# Patient Record
Sex: FEMALE | Race: WHITE | NOT HISPANIC OR LATINO
[De-identification: names, ages, dates, MRNs, and addresses within clinical notes are randomized per-mention and may not be internally consistent; named-entity substitution may affect disease eponyms.]

---

## 2017-02-22 ENCOUNTER — APPOINTMENT (OUTPATIENT)
Dept: CARDIOLOGY | Facility: CLINIC | Age: 32
End: 2017-02-22

## 2017-02-22 ENCOUNTER — NON-APPOINTMENT (OUTPATIENT)
Age: 32
End: 2017-02-22

## 2017-02-22 VITALS
HEIGHT: 63 IN | DIASTOLIC BLOOD PRESSURE: 80 MMHG | SYSTOLIC BLOOD PRESSURE: 106 MMHG | WEIGHT: 160 LBS | HEART RATE: 80 BPM | BODY MASS INDEX: 28.35 KG/M2

## 2017-02-22 DIAGNOSIS — G43.009 MIGRAINE W/OUT AURA, NOT INTRACTABLE, W/OUT STATUS MIGRAINOSUS: ICD-10-CM

## 2017-02-22 DIAGNOSIS — F41.9 ANXIETY DISORDER, UNSPECIFIED: ICD-10-CM

## 2017-02-22 DIAGNOSIS — Z78.9 OTHER SPECIFIED HEALTH STATUS: ICD-10-CM

## 2017-02-23 LAB
BASOPHILS # BLD AUTO: 0.04 K/UL
BASOPHILS NFR BLD AUTO: 0.4 %
CHOLEST SERPL-MCNC: 176 MG/DL
CHOLEST/HDLC SERPL: 3.1 RATIO
EOSINOPHIL # BLD AUTO: 0.12 K/UL
EOSINOPHIL NFR BLD AUTO: 1.2 %
HCT VFR BLD CALC: 40.9 %
HDLC SERPL-MCNC: 57 MG/DL
HGB BLD-MCNC: 13.8 G/DL
IMM GRANULOCYTES NFR BLD AUTO: 0.2 %
LDLC SERPL CALC-MCNC: 95 MG/DL
LDLC SERPL DIRECT ASSAY-MCNC: 88 MG/DL
LYMPHOCYTES # BLD AUTO: 2.83 K/UL
LYMPHOCYTES NFR BLD AUTO: 28.4 %
MAN DIFF?: NORMAL
MCHC RBC-ENTMCNC: 31.1 PG
MCHC RBC-ENTMCNC: 33.7 GM/DL
MCV RBC AUTO: 92.1 FL
MONOCYTES # BLD AUTO: 0.41 K/UL
MONOCYTES NFR BLD AUTO: 4.1 %
NEUTROPHILS # BLD AUTO: 6.55 K/UL
NEUTROPHILS NFR BLD AUTO: 65.7 %
PLATELET # BLD AUTO: 272 K/UL
RBC # BLD: 4.44 M/UL
RBC # FLD: 12.6 %
TRIGL SERPL-MCNC: 122 MG/DL
WBC # FLD AUTO: 9.97 K/UL

## 2017-03-10 ENCOUNTER — APPOINTMENT (OUTPATIENT)
Dept: CARDIOLOGY | Facility: CLINIC | Age: 32
End: 2017-03-10

## 2017-03-10 ENCOUNTER — NON-APPOINTMENT (OUTPATIENT)
Age: 32
End: 2017-03-10

## 2017-03-10 VITALS
HEART RATE: 82 BPM | SYSTOLIC BLOOD PRESSURE: 108 MMHG | HEIGHT: 63 IN | BODY MASS INDEX: 28.53 KG/M2 | DIASTOLIC BLOOD PRESSURE: 78 MMHG | RESPIRATION RATE: 14 BRPM | WEIGHT: 161 LBS

## 2017-03-10 LAB
ALBUMIN SERPL ELPH-MCNC: 4.3 G/DL
ALP BLD-CCNC: 97 U/L
ALT SERPL-CCNC: 127 U/L
ANION GAP SERPL CALC-SCNC: 16 MMOL/L
AST SERPL-CCNC: 59 U/L
BILIRUB SERPL-MCNC: 0.4 MG/DL
BUN SERPL-MCNC: 10 MG/DL
CALCIUM SERPL-MCNC: 9.8 MG/DL
CHLORIDE SERPL-SCNC: 102 MMOL/L
CO2 SERPL-SCNC: 22 MMOL/L
CREAT SERPL-MCNC: 0.72 MG/DL
GLUCOSE SERPL-MCNC: 106 MG/DL
POTASSIUM SERPL-SCNC: 4.6 MMOL/L
PROT SERPL-MCNC: 7.7 G/DL
SODIUM SERPL-SCNC: 140 MMOL/L

## 2017-03-13 LAB
ALBUMIN SERPL ELPH-MCNC: 5 G/DL
ALP BLD-CCNC: 80 U/L
ALT SERPL-CCNC: 58 U/L
AST SERPL-CCNC: 36 U/L
BILIRUB DIRECT SERPL-MCNC: 0.1 MG/DL
BILIRUB INDIRECT SERPL-MCNC: 0.3 MG/DL
BILIRUB SERPL-MCNC: 0.4 MG/DL
PROT SERPL-MCNC: 8.3 G/DL

## 2018-01-10 ENCOUNTER — APPOINTMENT (OUTPATIENT)
Dept: OTOLARYNGOLOGY | Facility: CLINIC | Age: 33
End: 2018-01-10
Payer: COMMERCIAL

## 2018-01-10 VITALS
WEIGHT: 145 LBS | SYSTOLIC BLOOD PRESSURE: 104 MMHG | BODY MASS INDEX: 25.69 KG/M2 | DIASTOLIC BLOOD PRESSURE: 74 MMHG | HEIGHT: 63 IN | HEART RATE: 93 BPM

## 2018-01-10 DIAGNOSIS — J34.2 DEVIATED NASAL SEPTUM: ICD-10-CM

## 2018-01-10 DIAGNOSIS — H90.A12 CONDUCTIVE HEARING LOSS, UNILATERAL, LEFT EAR WITH RESTRICTED HEARING ON THE CONTRALATERAL SIDE: ICD-10-CM

## 2018-01-10 DIAGNOSIS — H69.83 OTHER SPECIFIED DISORDERS OF EUSTACHIAN TUBE, BILATERAL: ICD-10-CM

## 2018-01-10 DIAGNOSIS — J31.0 CHRONIC RHINITIS: ICD-10-CM

## 2018-01-10 DIAGNOSIS — M26.609 UNSPECIFIED TEMPOROMANDIBULAR JOINT DISORDER: ICD-10-CM

## 2018-01-10 PROCEDURE — 92557 COMPREHENSIVE HEARING TEST: CPT

## 2018-01-10 PROCEDURE — 92567 TYMPANOMETRY: CPT

## 2018-01-10 PROCEDURE — 99214 OFFICE O/P EST MOD 30 MIN: CPT | Mod: 25

## 2018-01-10 RX ORDER — NORETHINDRONE ACETATE AND ETHINYL ESTRADIOL AND FERROUS FUMARATE 1MG-20(21)
1-20 KIT ORAL
Qty: 28 | Refills: 0 | Status: ACTIVE | COMMUNITY
Start: 2017-10-18

## 2018-11-27 ENCOUNTER — RESULT REVIEW (OUTPATIENT)
Age: 33
End: 2018-11-27

## 2019-01-22 ENCOUNTER — NON-APPOINTMENT (OUTPATIENT)
Age: 34
End: 2019-01-22

## 2019-01-22 ENCOUNTER — APPOINTMENT (OUTPATIENT)
Dept: CARDIOLOGY | Facility: CLINIC | Age: 34
End: 2019-01-22
Payer: COMMERCIAL

## 2019-01-22 VITALS
HEIGHT: 63 IN | OXYGEN SATURATION: 100 % | DIASTOLIC BLOOD PRESSURE: 80 MMHG | BODY MASS INDEX: 30.12 KG/M2 | WEIGHT: 170 LBS | SYSTOLIC BLOOD PRESSURE: 117 MMHG | HEART RATE: 102 BPM

## 2019-01-22 DIAGNOSIS — R07.2 PRECORDIAL PAIN: ICD-10-CM

## 2019-01-22 DIAGNOSIS — Z02.82 ENCOUNTER FOR ADOPTION SERVICES: ICD-10-CM

## 2019-01-22 DIAGNOSIS — R06.02 SHORTNESS OF BREATH: ICD-10-CM

## 2019-01-22 PROCEDURE — 93000 ELECTROCARDIOGRAM COMPLETE: CPT | Mod: 59

## 2019-01-22 PROCEDURE — 93325 DOPPLER ECHO COLOR FLOW MAPG: CPT

## 2019-01-22 PROCEDURE — 93312 ECHO TRANSESOPHAGEAL: CPT

## 2019-01-22 PROCEDURE — 99214 OFFICE O/P EST MOD 30 MIN: CPT

## 2019-01-22 PROCEDURE — 93320 DOPPLER ECHO COMPLETE: CPT

## 2019-01-22 RX ORDER — METAXALONE 800 MG/1
800 TABLET ORAL
Qty: 5 | Refills: 0 | Status: DISCONTINUED | COMMUNITY
Start: 2018-01-06 | End: 2019-01-22

## 2019-01-22 RX ORDER — FLUOXETINE HYDROCHLORIDE 10 MG/1
10 CAPSULE ORAL
Qty: 30 | Refills: 0 | Status: DISCONTINUED | COMMUNITY
Start: 2017-12-05 | End: 2019-01-22

## 2019-01-22 RX ORDER — FLUOXETINE HYDROCHLORIDE 10 MG/1
10 TABLET ORAL
Qty: 30 | Refills: 0 | Status: DISCONTINUED | COMMUNITY
Start: 2017-11-16 | End: 2019-01-22

## 2019-01-22 RX ORDER — NAPROXEN 500 MG/1
500 TABLET, DELAYED RELEASE ORAL
Qty: 60 | Refills: 3 | Status: COMPLETED | COMMUNITY
Start: 2017-02-22 | End: 2019-01-22

## 2019-01-22 NOTE — HISTORY OF PRESENT ILLNESS
[FreeTextEntry1] : I last saw her in May of 2017.\par \par She reports that four days ago, she began to experience precordial chest pain which has persisted continuously since that time.  She was seen at at [facility] office, and EKG and CXR were unremarkable.  Motrin has not provided significant relief.  When lying recumbent, the discomfort is worse and she then also experiences SOB.  She is aware that her HR is faster (110-120) with occasional palpitations.  She reports no fever or other constitutional signs.  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  At that time, a CT scan showed a pericardial effusion and she was treated for presumptive pericarditis with NSAIDs.

## 2019-01-22 NOTE — PHYSICAL EXAM
[General Appearance - Well Developed] : well developed [Normal Appearance] : normal appearance [Well Groomed] : well groomed [General Appearance - Well Nourished] : well nourished [General Appearance - In No Acute Distress] : no acute distress [Normal Conjunctiva] : the conjunctiva exhibited no abnormalities [Eyelids - No Xanthelasma] : the eyelids demonstrated no xanthelasmas [Normal Jugular Venous A Waves Present] : normal jugular venous A waves present [Normal Jugular Venous V Waves Present] : normal jugular venous V waves present [No Jugular Venous Medellin A Waves] : no jugular venous medellin A waves [Respiration, Rhythm And Depth] : normal respiratory rhythm and effort [Auscultation Breath Sounds / Voice Sounds] : lungs were clear to auscultation bilaterally [Heart Rate And Rhythm] : heart rate and rhythm were normal [Bowel Sounds] : normal bowel sounds [Abnormal Walk] : normal gait [Gait - Sufficient For Exercise Testing] : the gait was sufficient for exercise testing [Nail Clubbing] : no clubbing of the fingernails [Cyanosis, Localized] : no localized cyanosis [Petechial Hemorrhages (___cm)] : no petechial hemorrhages [Skin Color & Pigmentation] : normal skin color and pigmentation [] : no rash [No Venous Stasis] : no venous stasis [Skin Lesions] : no skin lesions [No Skin Ulcers] : no skin ulcer [No Xanthoma] : no  xanthoma was observed [Oriented To Time, Place, And Person] : oriented to person, place, and time [Impaired Insight] : insight and judgment were intact [Affect] : the affect was normal [Mood] : the mood was normal [FreeTextEntry1] : PMI not displaced. First and second heart sounds are normal. No murmur, rub or gallop appreciated.

## 2019-08-06 ENCOUNTER — RESULT REVIEW (OUTPATIENT)
Age: 34
End: 2019-08-06

## 2019-09-11 ENCOUNTER — NON-APPOINTMENT (OUTPATIENT)
Age: 34
End: 2019-09-11

## 2019-09-11 ENCOUNTER — APPOINTMENT (OUTPATIENT)
Dept: CARDIOLOGY | Facility: CLINIC | Age: 34
End: 2019-09-11
Payer: COMMERCIAL

## 2019-09-11 VITALS
WEIGHT: 167 LBS | TEMPERATURE: 98.7 F | BODY MASS INDEX: 29.59 KG/M2 | HEIGHT: 63 IN | OXYGEN SATURATION: 99 % | DIASTOLIC BLOOD PRESSURE: 84 MMHG | HEART RATE: 105 BPM | SYSTOLIC BLOOD PRESSURE: 129 MMHG

## 2019-09-11 PROCEDURE — 93000 ELECTROCARDIOGRAM COMPLETE: CPT

## 2019-09-11 PROCEDURE — 99214 OFFICE O/P EST MOD 30 MIN: CPT

## 2019-09-11 RX ORDER — NAPROXEN 500 MG/1
500 TABLET ORAL TWICE DAILY
Qty: 60 | Refills: 3 | Status: ACTIVE | COMMUNITY
Start: 2019-01-22 | End: 1900-01-01

## 2019-09-11 NOTE — DISCUSSION/SUMMARY
[Non-Cardiac] : non-cardiac chest pain [Musculoskeletal Chest Pain] : musculoskeletal chest pain [Medication Changes Per Orders] : as documented in orders [Patient] : the patient [de-identified] : reasonable to have trial of NSAID, naproxen beneficial in past

## 2019-09-11 NOTE — HISTORY OF PRESENT ILLNESS
[FreeTextEntry1] : Ms Noelle Hobbs is a 34 year old woman requesting urgent visit today for pain in left side. previously evaluated for possible pericarditis and symptoms subsided after period of NSAID therapy. At that time had precordial chest pain which was persistent. Lying recumbent, exacerbated symptoms with associated SOB. She recalled a similar episode in 2015 when a CT scan showed a pericardial effusion and she was treated for presumptive pericarditis with NSAIDs.\par \par Woke with current symptoms at 2 AM, pain in left shoulder radiating down to fingers of left hand and radiating around toward left mid back.  Pain was not clearly exacerbated by deep breaths and it did hurt slightly to move the left arm.  Pain is currently less severe but still bothersome. She is not short of breath or having palpitations.

## 2019-09-11 NOTE — PHYSICAL EXAM
[Normal Appearance] : normal appearance [General Appearance - Well Developed] : well developed [Well Groomed] : well groomed [General Appearance - Well Nourished] : well nourished [Normal Conjunctiva] : the conjunctiva exhibited no abnormalities [General Appearance - In No Acute Distress] : no acute distress [Normal Jugular Venous A Waves Present] : normal jugular venous A waves present [Eyelids - No Xanthelasma] : the eyelids demonstrated no xanthelasmas [Normal Jugular Venous V Waves Present] : normal jugular venous V waves present [No Jugular Venous Medellin A Waves] : no jugular venous medellin A waves [Respiration, Rhythm And Depth] : normal respiratory rhythm and effort [Auscultation Breath Sounds / Voice Sounds] : lungs were clear to auscultation bilaterally [Heart Rate And Rhythm] : heart rate and rhythm were normal [Bowel Sounds] : normal bowel sounds [Abnormal Walk] : normal gait [Nail Clubbing] : no clubbing of the fingernails [Cyanosis, Localized] : no localized cyanosis [Petechial Hemorrhages (___cm)] : no petechial hemorrhages [] : no rash [Skin Color & Pigmentation] : normal skin color and pigmentation [No Venous Stasis] : no venous stasis [Skin Lesions] : no skin lesions [No Skin Ulcers] : no skin ulcer [No Xanthoma] : no  xanthoma was observed [Oriented To Time, Place, And Person] : oriented to person, place, and time [Impaired Insight] : insight and judgment were intact [Affect] : the affect was normal [Mood] : the mood was normal [Normal Oral Mucosa] : normal oral mucosa [No Oral Pallor] : no oral pallor [No Oral Cyanosis] : no oral cyanosis [FreeTextEntry1] : mild discomfort on rotation of shoulder

## 2020-02-27 ENCOUNTER — APPOINTMENT (OUTPATIENT)
Dept: OPHTHALMOLOGY | Facility: CLINIC | Age: 35
End: 2020-02-27
Payer: SELF-PAY

## 2020-02-27 ENCOUNTER — NON-APPOINTMENT (OUTPATIENT)
Age: 35
End: 2020-02-27

## 2020-02-27 PROCEDURE — 92002 INTRM OPH EXAM NEW PATIENT: CPT

## 2020-06-09 ENCOUNTER — RESULT REVIEW (OUTPATIENT)
Age: 35
End: 2020-06-09

## 2020-06-18 ENCOUNTER — APPOINTMENT (OUTPATIENT)
Dept: SURGICAL ONCOLOGY | Facility: CLINIC | Age: 35
End: 2020-06-18
Payer: COMMERCIAL

## 2020-06-18 VITALS
SYSTOLIC BLOOD PRESSURE: 119 MMHG | HEART RATE: 107 BPM | DIASTOLIC BLOOD PRESSURE: 82 MMHG | OXYGEN SATURATION: 98 % | WEIGHT: 176 LBS | BODY MASS INDEX: 31.18 KG/M2 | HEIGHT: 63 IN

## 2020-06-18 VITALS — TEMPERATURE: 97.1 F

## 2020-06-18 DIAGNOSIS — R07.89 OTHER CHEST PAIN: ICD-10-CM

## 2020-06-18 PROCEDURE — 99205 OFFICE O/P NEW HI 60 MIN: CPT

## 2020-06-18 NOTE — HISTORY OF PRESENT ILLNESS
[de-identified] : 35 yo female with right nipple brown discharge. this has been present for 6 months. It is spontaneous.\par She did have an intraductal papilloma excised from her left breast by me 13 years ago.\par \par fibrocystic changes but no masses

## 2020-06-18 NOTE — ASSESSMENT
[FreeTextEntry1] : IMP:\par Right breast nipple dishcarge\par \par Plan:\par right breast duct exploration

## 2020-06-18 NOTE — CONSULT LETTER
[Consult Letter:] : I had the pleasure of evaluating your patient, [unfilled]. [Dear  ___] : Dear  [unfilled], [Consult Closing:] : Thank you very much for allowing me to participate in the care of this patient.  If you have any questions, please do not hesitate to contact me. [Sincerely,] : Sincerely, [Please see my note below.] : Please see my note below. [FreeTextEntry3] : Narciso Erwin MD FACS\par Chief of Surgical Oncology\par \par  [FreeTextEntry1] : I will keep you informed of the final pathology.

## 2020-06-18 NOTE — PHYSICAL EXAM
[Normal] : supple, no neck mass and thyroid not enlarged [Normal Supraclavicular Lymph Nodes] : normal supraclavicular lymph nodes [Normal Neck Lymph Nodes] : normal neck lymph nodes  [Normal Axillary Lymph Nodes] : normal axillary lymph nodes [Normal] : oriented to person, place and time, with appropriate affect [de-identified] : Fibrocystic changes with brown guaiac + drainage from 11-12 o'clock right breast

## 2020-06-30 ENCOUNTER — OUTPATIENT (OUTPATIENT)
Dept: OUTPATIENT SERVICES | Facility: HOSPITAL | Age: 35
LOS: 1 days | End: 2020-06-30

## 2020-06-30 VITALS
OXYGEN SATURATION: 99 % | WEIGHT: 179.9 LBS | HEIGHT: 63 IN | HEART RATE: 114 BPM | SYSTOLIC BLOOD PRESSURE: 120 MMHG | RESPIRATION RATE: 18 BRPM | DIASTOLIC BLOOD PRESSURE: 88 MMHG | TEMPERATURE: 99 F

## 2020-06-30 DIAGNOSIS — R07.89 OTHER CHEST PAIN: ICD-10-CM

## 2020-06-30 DIAGNOSIS — Z98.890 OTHER SPECIFIED POSTPROCEDURAL STATES: Chronic | ICD-10-CM

## 2020-06-30 DIAGNOSIS — Z90.49 ACQUIRED ABSENCE OF OTHER SPECIFIED PARTS OF DIGESTIVE TRACT: Chronic | ICD-10-CM

## 2020-06-30 LAB
HCG UR-SCNC: NEGATIVE — SIGNIFICANT CHANGE UP
HCT VFR BLD CALC: 40.4 % — SIGNIFICANT CHANGE UP (ref 34.5–45)
HGB BLD-MCNC: 13.8 G/DL — SIGNIFICANT CHANGE UP (ref 11.5–15.5)
MCHC RBC-ENTMCNC: 30.1 PG — SIGNIFICANT CHANGE UP (ref 27–34)
MCHC RBC-ENTMCNC: 34.2 % — SIGNIFICANT CHANGE UP (ref 32–36)
MCV RBC AUTO: 88 FL — SIGNIFICANT CHANGE UP (ref 80–100)
NRBC # FLD: 0 K/UL — SIGNIFICANT CHANGE UP (ref 0–0)
PLATELET # BLD AUTO: 337 K/UL — SIGNIFICANT CHANGE UP (ref 150–400)
PMV BLD: 9.9 FL — SIGNIFICANT CHANGE UP (ref 7–13)
RBC # BLD: 4.59 M/UL — SIGNIFICANT CHANGE UP (ref 3.8–5.2)
RBC # FLD: 12.2 % — SIGNIFICANT CHANGE UP (ref 10.3–14.5)
SP GR UR: 1.03 — SIGNIFICANT CHANGE UP (ref 1–1.04)
WBC # BLD: 9.78 K/UL — SIGNIFICANT CHANGE UP (ref 3.8–10.5)
WBC # FLD AUTO: 9.78 K/UL — SIGNIFICANT CHANGE UP (ref 3.8–10.5)

## 2020-06-30 NOTE — H&P PST ADULT - NEGATIVE GENERAL GENITOURINARY SYMPTOMS
no hematuria/no renal colic/no flank pain L/no flank pain R/no incontinence/no bladder infections/no dysuria

## 2020-06-30 NOTE — H&P PST ADULT - NSICDXPASTMEDICALHX_GEN_ALL_CORE_FT
PAST MEDICAL HISTORY:  Depression     Other chest pain PAST MEDICAL HISTORY:  Anxiety and depression     Other chest pain

## 2020-06-30 NOTE — H&P PST ADULT - NEGATIVE ENMT SYMPTOMS
no nasal congestion/no hearing difficulty/no post-nasal discharge/no sinus symptoms/no throat pain/no dysphagia

## 2020-06-30 NOTE — H&P PST ADULT - MUSCULOSKELETAL
details… detailed exam no calf tenderness/no joint swelling/ROM intact/normal strength/no joint erythema

## 2020-06-30 NOTE — H&P PST ADULT - HISTORY OF PRESENT ILLNESS
34 y.o. female with hx of intraductal papilloma right breast, s/p surgery in 1992, c/o right breast nipple bloody discharge x 6 months, reports right breast "mild" tenderness, denies breast changes, weight loss or fatigue, scheduled for right breast duct exploration on 07/09/2020

## 2020-06-30 NOTE — H&P PST ADULT - NSICDXPROBLEM_GEN_ALL_CORE_FT
PROBLEM DIAGNOSES  Problem: Other chest pain  Assessment and Plan: pt scheduled for right breast duct exploration on 07/09/2020  Preop instructions provided. Pt verbalized understanding.   Pepcid for GI prophylaxis with written and verbal instruction provided    written and verbal instructions with teach back on chlorhexidine shampoo provided,  pt verbalized understanding with returned demonstration   covid testing pending on 07/06/2020, testing site # given  allergy to PCN-OR booking notified

## 2020-07-05 DIAGNOSIS — Z01.818 ENCOUNTER FOR OTHER PREPROCEDURAL EXAMINATION: ICD-10-CM

## 2020-07-06 ENCOUNTER — APPOINTMENT (OUTPATIENT)
Dept: DISASTER EMERGENCY | Facility: CLINIC | Age: 35
End: 2020-07-06

## 2020-07-06 LAB — SARS-COV-2 N GENE NPH QL NAA+PROBE: NOT DETECTED

## 2020-07-08 ENCOUNTER — TRANSCRIPTION ENCOUNTER (OUTPATIENT)
Age: 35
End: 2020-07-08

## 2020-07-09 ENCOUNTER — RESULT REVIEW (OUTPATIENT)
Age: 35
End: 2020-07-09

## 2020-07-09 ENCOUNTER — OUTPATIENT (OUTPATIENT)
Dept: OUTPATIENT SERVICES | Facility: HOSPITAL | Age: 35
LOS: 1 days | Discharge: HOME | End: 2020-07-09
Payer: COMMERCIAL

## 2020-07-09 ENCOUNTER — APPOINTMENT (OUTPATIENT)
Dept: SURGICAL ONCOLOGY | Facility: AMBULATORY SURGERY CENTER | Age: 35
End: 2020-07-09

## 2020-07-09 VITALS
SYSTOLIC BLOOD PRESSURE: 122 MMHG | RESPIRATION RATE: 18 BRPM | WEIGHT: 179.9 LBS | TEMPERATURE: 98 F | HEART RATE: 98 BPM | HEIGHT: 63 IN | DIASTOLIC BLOOD PRESSURE: 76 MMHG | OXYGEN SATURATION: 100 %

## 2020-07-09 VITALS
SYSTOLIC BLOOD PRESSURE: 112 MMHG | RESPIRATION RATE: 16 BRPM | DIASTOLIC BLOOD PRESSURE: 86 MMHG | OXYGEN SATURATION: 100 % | HEART RATE: 89 BPM

## 2020-07-09 DIAGNOSIS — Z98.890 OTHER SPECIFIED POSTPROCEDURAL STATES: Chronic | ICD-10-CM

## 2020-07-09 DIAGNOSIS — R07.89 OTHER CHEST PAIN: ICD-10-CM

## 2020-07-09 DIAGNOSIS — Z90.49 ACQUIRED ABSENCE OF OTHER SPECIFIED PARTS OF DIGESTIVE TRACT: Chronic | ICD-10-CM

## 2020-07-09 PROCEDURE — 19301 PARTIAL MASTECTOMY: CPT

## 2020-07-09 PROCEDURE — 88307 TISSUE EXAM BY PATHOLOGIST: CPT | Mod: 26

## 2020-07-09 PROCEDURE — 88305 TISSUE EXAM BY PATHOLOGIST: CPT | Mod: 26

## 2020-07-09 RX ORDER — NORETHINDRONE AND ETHINYL ESTRADIOL 0.4-0.035
1 KIT ORAL
Qty: 0 | Refills: 0 | DISCHARGE

## 2020-07-09 RX ORDER — FLUOXETINE HCL 10 MG
1 CAPSULE ORAL
Qty: 0 | Refills: 0 | DISCHARGE

## 2020-07-09 RX ORDER — BUPROPION HYDROCHLORIDE 150 MG/1
150 TABLET, EXTENDED RELEASE ORAL
Qty: 0 | Refills: 0 | DISCHARGE

## 2020-07-09 NOTE — BRIEF OPERATIVE NOTE - OPERATION/FINDINGS
Surgical site was marked preoperatively and confirmed. Skin was prepped and draped in sterile fashion. Local was injected along right areola. Duct wire was inserted into right nipple. Incision was made along right areola and tissue was dissected down to duct. The distal duct was excised and margins were taken and sent to permanent. The site was hemostatic and closed with 2-0 vicryl deep stitches. Skin was approximated with dermal stitches and skin was closed with 4-0 monocryl. Sterile dressing was applied.

## 2020-07-09 NOTE — ASU DISCHARGE PLAN (ADULT/PEDIATRIC) - FOLLOW UP APPOINTMENTS
911 or go to the nearest Emergency Room [facility] ([facility]):

## 2020-07-09 NOTE — ASU DISCHARGE PLAN (ADULT/PEDIATRIC) - ASU DC SPECIAL INSTRUCTIONSFT
Please keep dressing on for 24 hours. Do not bathe for 24 hours. After 24 hours you may remove dressing and shower. Please leave steri strips on until they fall off. Follow up with Dr. Erwin in 2 weeks.

## 2020-07-09 NOTE — ASU DISCHARGE PLAN (ADULT/PEDIATRIC) - CARE PROVIDER_API CALL
Narciso Erwin  SURGERY  [address] [address] [place] [id number]  Phone: [phone]  Fax: [phone]  Follow Up Time: 2 weeks

## 2020-07-09 NOTE — BRIEF OPERATIVE NOTE - SPECIMENS
Right breast papilloma; Superior margin; Medial margin; Lateral margin; Inferior margin; Deep margin

## 2020-07-09 NOTE — ASU DISCHARGE PLAN (ADULT/PEDIATRIC) - CALL YOUR DOCTOR IF YOU HAVE ANY OF THE FOLLOWING:
Swelling that gets worse/Pain not relieved by Medications/Numbness, tingling, color or temperature change to extremity/Wound/Surgical Site with redness, or foul smelling discharge or pus/Bleeding that does not stop/Fever greater than (need to indicate Fahrenheit or Celsius)

## 2020-07-13 PROBLEM — R07.89 OTHER CHEST PAIN: Chronic | Status: ACTIVE | Noted: 2020-06-30

## 2020-07-13 PROBLEM — F41.9 ANXIETY DISORDER, UNSPECIFIED: Chronic | Status: ACTIVE | Noted: 2020-06-30

## 2020-07-14 LAB — SURGICAL PATHOLOGY STUDY: SIGNIFICANT CHANGE UP

## 2020-07-20 ENCOUNTER — APPOINTMENT (OUTPATIENT)
Dept: SURGICAL ONCOLOGY | Facility: CLINIC | Age: 35
End: 2020-07-20
Payer: COMMERCIAL

## 2020-07-20 ENCOUNTER — TRANSCRIPTION ENCOUNTER (OUTPATIENT)
Age: 35
End: 2020-07-20

## 2020-07-20 VITALS
HEART RATE: 97 BPM | BODY MASS INDEX: 31.18 KG/M2 | HEIGHT: 63 IN | SYSTOLIC BLOOD PRESSURE: 124 MMHG | DIASTOLIC BLOOD PRESSURE: 84 MMHG | OXYGEN SATURATION: 99 % | WEIGHT: 176 LBS

## 2020-07-20 DIAGNOSIS — N64.89 OTHER SPECIFIED DISORDERS OF BREAST: ICD-10-CM

## 2020-07-20 PROCEDURE — 99214 OFFICE O/P EST MOD 30 MIN: CPT | Mod: 24

## 2020-07-20 NOTE — HISTORY OF PRESENT ILLNESS
[de-identified] : 36 YO female S/P right breast duct exporation for bloody discharge. Pathology showed hyperplasia and dilated ducts but no atypia.\par \par

## 2020-07-20 NOTE — CONSULT LETTER
[Dear  ___] : Dear  [unfilled], [Courtesy Letter:] : I had the pleasure of seeing your patient, [unfilled], in my office today. [Please see my note below.] : Please see my note below. [FreeTextEntry1] : I will see her next year when she gets a baseline mammogram. [Consult Closing:] : Thank you very much for allowing me to participate in the care of this patient.  If you have any questions, please do not hesitate to contact me. [Sincerely,] : Sincerely, [FreeTextEntry3] : Narciso Erwin MD FACS\par Chief of Surgical Oncology\par \par

## 2020-07-20 NOTE — ASSESSMENT
[FreeTextEntry1] : IMP:\par Fibrocystic changes with hyperplasia but no atypia\par \par Plan:\par Baseline mammogram and sonogram and RTO 1 year ( at age 36 )

## 2021-02-10 ENCOUNTER — NON-APPOINTMENT (OUTPATIENT)
Age: 36
End: 2021-02-10

## 2021-06-22 ENCOUNTER — RESULT REVIEW (OUTPATIENT)
Age: 36
End: 2021-06-22

## 2021-07-28 ENCOUNTER — APPOINTMENT (OUTPATIENT)
Dept: OTOLARYNGOLOGY | Facility: CLINIC | Age: 36
End: 2021-07-28

## 2021-07-28 ENCOUNTER — APPOINTMENT (OUTPATIENT)
Dept: OTOLARYNGOLOGY | Facility: CLINIC | Age: 36
End: 2021-07-28
Payer: COMMERCIAL

## 2021-07-28 VITALS
SYSTOLIC BLOOD PRESSURE: 119 MMHG | TEMPERATURE: 97.6 F | WEIGHT: 180 LBS | OXYGEN SATURATION: 98 % | HEIGHT: 63 IN | BODY MASS INDEX: 31.89 KG/M2 | DIASTOLIC BLOOD PRESSURE: 83 MMHG | HEART RATE: 102 BPM

## 2021-07-28 DIAGNOSIS — R42 DIZZINESS AND GIDDINESS: ICD-10-CM

## 2021-07-28 PROCEDURE — 99072 ADDL SUPL MATRL&STAF TM PHE: CPT

## 2021-07-28 PROCEDURE — 99203 OFFICE O/P NEW LOW 30 MIN: CPT

## 2021-07-28 RX ORDER — FLUOXETINE HYDROCHLORIDE 40 MG/1
CAPSULE ORAL
Refills: 0 | Status: ACTIVE | COMMUNITY

## 2021-07-28 RX ORDER — CYCLOBENZAPRINE HYDROCHLORIDE 7.5 MG/1
TABLET, FILM COATED ORAL
Refills: 0 | Status: ACTIVE | COMMUNITY

## 2021-07-28 NOTE — ASSESSMENT
[FreeTextEntry1] : vertigo\par advised not to drive\par improving\par mainly positional has mild r beating gaze induced nystagmus but negative  d/g\par I offered meclizine if severe- she said she has it\par  vng mri ordered to try to determine etiology- could be resolving bppv, vn migraine md or something else

## 2021-07-28 NOTE — HISTORY OF PRESENT ILLNESS
[de-identified] : 35 yo woman with vertigo for the past 4 d - she woke up that way no uri no hl or tinnitus pain or drainage. No head trauma or other inciting event. It was spinning in quality an positional. She has had this with ear infx in the past. She tried her own Epley maneuver yesterday and it helped a little. No known fh- she is adopted. nonsmoker.

## 2021-07-28 NOTE — PHYSICAL EXAM
[Midline] : trachea located in midline position [] : [place]-Hallpike test is negative [Normal] : no rashes [de-identified] : mild r beating gaze induced nystagmus [de-identified] : felt dizzy to r though [de-identified] : gait steady

## 2021-07-29 RX ORDER — FLUOXETINE HYDROCHLORIDE 20 MG/1
20 CAPSULE ORAL
Qty: 30 | Refills: 0 | Status: ACTIVE | COMMUNITY
Start: 2021-01-05

## 2021-07-29 RX ORDER — BUPROPION HYDROCHLORIDE 150 MG/1
150 TABLET, EXTENDED RELEASE ORAL
Qty: 30 | Refills: 0 | Status: ACTIVE | COMMUNITY
Start: 2021-06-16

## 2021-07-29 RX ORDER — DICLOFENAC SODIUM 100 MG/1
100 TABLET, FILM COATED, EXTENDED RELEASE ORAL
Qty: 30 | Refills: 0 | Status: ACTIVE | COMMUNITY
Start: 2021-06-16

## 2021-07-29 RX ORDER — HALOBETASOL PROPIONATE 0.5 MG/G
0.05 CREAM TOPICAL
Qty: 15 | Refills: 0 | Status: ACTIVE | COMMUNITY
Start: 2021-02-10

## 2021-07-29 RX ORDER — NORETHINDRONE ACETATE AND ETHINYL ESTRADIOL, ETHINYL ESTRADIOL AND FERROUS FUMARATE 1MG-10(24)
1 MG-10 MCG / KIT ORAL
Qty: 84 | Refills: 0 | Status: ACTIVE | COMMUNITY
Start: 2021-06-14

## 2021-07-29 RX ORDER — HYDROXYZINE HYDROCHLORIDE 10 MG/1
10 TABLET ORAL
Qty: 30 | Refills: 0 | Status: ACTIVE | COMMUNITY
Start: 2021-02-10

## 2021-08-06 ENCOUNTER — APPOINTMENT (OUTPATIENT)
Dept: ULTRASOUND IMAGING | Facility: IMAGING CENTER | Age: 36
End: 2021-08-06

## 2021-08-12 ENCOUNTER — APPOINTMENT (OUTPATIENT)
Dept: OTOLARYNGOLOGY | Facility: CLINIC | Age: 36
End: 2021-08-12

## 2021-11-22 ENCOUNTER — RESULT REVIEW (OUTPATIENT)
Age: 36
End: 2021-11-22

## 2021-11-22 ENCOUNTER — OUTPATIENT (OUTPATIENT)
Dept: OUTPATIENT SERVICES | Facility: HOSPITAL | Age: 36
LOS: 1 days | End: 2021-11-22
Payer: COMMERCIAL

## 2021-11-22 ENCOUNTER — APPOINTMENT (OUTPATIENT)
Dept: MAMMOGRAPHY | Facility: IMAGING CENTER | Age: 36
End: 2021-11-22
Payer: COMMERCIAL

## 2021-11-22 ENCOUNTER — APPOINTMENT (OUTPATIENT)
Dept: ULTRASOUND IMAGING | Facility: IMAGING CENTER | Age: 36
End: 2021-11-22
Payer: COMMERCIAL

## 2021-11-22 DIAGNOSIS — N64.89 OTHER SPECIFIED DISORDERS OF BREAST: ICD-10-CM

## 2021-11-22 DIAGNOSIS — Z98.890 OTHER SPECIFIED POSTPROCEDURAL STATES: Chronic | ICD-10-CM

## 2021-11-22 DIAGNOSIS — Z90.49 ACQUIRED ABSENCE OF OTHER SPECIFIED PARTS OF DIGESTIVE TRACT: Chronic | ICD-10-CM

## 2021-11-22 PROCEDURE — G0279: CPT | Mod: 26

## 2021-11-22 PROCEDURE — 77066 DX MAMMO INCL CAD BI: CPT

## 2021-11-22 PROCEDURE — 76641 ULTRASOUND BREAST COMPLETE: CPT | Mod: 26,50

## 2021-11-22 PROCEDURE — 77066 DX MAMMO INCL CAD BI: CPT | Mod: 26

## 2021-11-22 PROCEDURE — 76641 ULTRASOUND BREAST COMPLETE: CPT

## 2021-11-22 PROCEDURE — G0279: CPT

## 2022-05-13 ENCOUNTER — RESULT REVIEW (OUTPATIENT)
Age: 37
End: 2022-05-13

## 2023-04-22 ENCOUNTER — APPOINTMENT (OUTPATIENT)
Dept: ORTHOPEDIC SURGERY | Facility: CLINIC | Age: 38
End: 2023-04-22
Payer: COMMERCIAL

## 2023-04-22 VITALS — HEIGHT: 63 IN | BODY MASS INDEX: 31.01 KG/M2 | WEIGHT: 175 LBS

## 2023-04-22 DIAGNOSIS — S92.355A NONDISPLACED FRACTURE OF FIFTH METATARSAL BONE, LEFT FOOT, INITIAL ENCOUNTER FOR CLOSED FRACTURE: ICD-10-CM

## 2023-04-22 PROCEDURE — 99203 OFFICE O/P NEW LOW 30 MIN: CPT | Mod: 25

## 2023-04-22 PROCEDURE — L4361: CPT | Mod: LT

## 2023-04-22 PROCEDURE — 28470 CLTX METATARSAL FX WO MNP EA: CPT | Mod: LT

## 2023-04-22 NOTE — HISTORY OF PRESENT ILLNESS
[Left Leg] : left leg [Sudden] : sudden [9] : 9 [3] : 3 [Localized] : localized [Throbbing] : throbbing [Constant] : constant [Meds] : meds [de-identified] : Pt. is a 37 year old female who presents for evaluation of her LT foot. Reports twisting injury on 4/20/23, evaluated at [facility] and diagnosed with 5th metatarsal fracture. NWB in splint, using crutches. Ice to affected area. NSAIDS prn. No previous injury/problem with LT foot.  [] : no [FreeTextEntry3] : 4/20/23 [FreeTextEntry5] : pt states that she injured her left ankle going down stairs. pt states that she heard a crack. pt is on crutches.  [FreeTextEntry9] : advil  [de-identified] : motion  [de-identified] : none

## 2023-04-22 NOTE — PHYSICAL EXAM
[Left] : left foot and ankle [Moderate] : moderate swelling of lateral foot [4th] : 4th [5th] : 5th [NL (40)] : plantar flexion 40 degrees [3___] : eversion 3[unfilled]/5 [5___] : [facility] 5[unfilled]/5 [2+] : posterior tibialis pulse: 2+ [Normal] : saphenous nerve sensation normal [] : ambulation with crutches [de-identified] : inversion 20 degrees [TWNoteComboBox7] : dorsiflexion 10 degrees [de-identified] : eversion 15 degrees

## 2023-04-22 NOTE — DATA REVIEWED
[Outside X-rays] : outside x-rays [Left] : left [Foot] : foot [Report was reviewed and noted in the chart] : The report was reviewed and noted in the chart [I independently reviewed and interpreted images and report] : I independently reviewed and interpreted images and report [FreeTextEntry1] : [facility] 4/20/23: Nondisplaced 5th metatarsal base fracture.

## 2023-05-05 ENCOUNTER — APPOINTMENT (OUTPATIENT)
Dept: ORTHOPEDIC SURGERY | Facility: CLINIC | Age: 38
End: 2023-05-05
Payer: COMMERCIAL

## 2023-05-05 DIAGNOSIS — S92.355D NONDISPLACED FRACTURE OF FIFTH METATARSAL BONE, LEFT FOOT, SUBSEQUENT ENCOUNTER FOR FRACTURE WITH ROUTINE HEALING: ICD-10-CM

## 2023-05-05 PROCEDURE — 99024 POSTOP FOLLOW-UP VISIT: CPT

## 2023-05-05 PROCEDURE — 73630 X-RAY EXAM OF FOOT: CPT | Mod: LT

## 2023-05-05 NOTE — ASSESSMENT
[FreeTextEntry1] : Patient is doing well.  She can be wbat in her cam boot.\par Ice/elevation/nsaids prn.\par \par Repeat x-ray will be performed at the next office visit.\par

## 2023-05-05 NOTE — PHYSICAL EXAM
[Moderate] : moderate swelling of lateral foot [4th] : 4th [5th] : 5th [NL (40)] : plantar flexion 40 degrees [5___] : inversion 5[unfilled]/5 [2+] : posterior tibialis pulse: 2+ [Normal] : saphenous nerve sensation normal [Mild] : mild swelling of dorsal foot [4___] : eversion 4[unfilled]/5 [] : no pain when stressing lateral tarsal metatarsal joint [Left] : left foot [de-identified] : 5th metatarsal base fracture with mild reabsorption at fracture site.  No change in alignment. [de-identified] : inversion 20 degrees [de-identified] : eversion 15 degrees [TWNoteComboBox7] : dorsiflexion 10 degrees

## 2023-05-05 NOTE — HISTORY OF PRESENT ILLNESS
[Left Leg] : left leg [Sudden] : sudden [Throbbing] : throbbing [Constant] : constant [Meds] : meds [5] : 5 [2] : 2 [de-identified] : Patient presents for follow-up on her left 5th metatarsal fracture on 4/20/23 after a twisting injury. She has been PWB in a CAM boot with crutches. She reports doing better, but still has some pain. [] : no [FreeTextEntry3] : 4/20/23 [FreeTextEntry5] : pt states that she injured her left ankle going down stairs. pt states that she heard a crack. pt is on crutches.  [FreeTextEntry9] : advil  [de-identified] : motion  [de-identified] : none

## 2023-06-12 ENCOUNTER — APPOINTMENT (OUTPATIENT)
Dept: ORTHOPEDIC SURGERY | Facility: CLINIC | Age: 38
End: 2023-06-12
Payer: COMMERCIAL

## 2023-06-12 DIAGNOSIS — Z00.00 ENCOUNTER FOR GENERAL ADULT MEDICAL EXAMINATION W/OUT ABNORMAL FINDINGS: ICD-10-CM

## 2023-06-12 DIAGNOSIS — S92.352D DISPLACED FRACTURE OF FIFTH METATARSAL BONE, LEFT FOOT, SUBSEQUENT ENCOUNTER FOR FRACTURE WITH ROUTINE HEALING: ICD-10-CM

## 2023-06-12 PROCEDURE — 99024 POSTOP FOLLOW-UP VISIT: CPT

## 2023-06-12 PROCEDURE — 73630 X-RAY EXAM OF FOOT: CPT | Mod: LT

## 2023-06-12 NOTE — HISTORY OF PRESENT ILLNESS
[Left Leg] : left leg [Sudden] : sudden [Constant] : constant [Meds] : meds [2] : 2 [0] : 0 [Localized] : localized [de-identified] : Patient presents for follow-up on her left 5th metatarsal fracture from 4/20/23 after a twisting injury. She has been fully WB in a CAM boot and reports doing much better.  Aside from "mild discomfort once in a while" she has  no complaints. [] : no [FreeTextEntry3] : 4/20/23 [FreeTextEntry5] : pt states that she injured her left ankle going down stairs. pt states that she heard a crack. pt is on crutches.  [de-identified] : motion  [FreeTextEntry9] : advil  [de-identified] : none

## 2023-06-12 NOTE — PHYSICAL EXAM
[NL (40)] : plantar flexion 40 degrees [2+] : posterior tibialis pulse: 2+ [Normal] : saphenous nerve sensation normal [Left] : left foot [NL 30)] : inversion 30 degrees [NL (20)] : eversion 20 degrees [5___] : eversion 5[unfilled]/5 [] : non-antalgic [Weight -] : weightbearing [The fracture is in acceptable alignment. There is progression in healing seen] : The fracture is in acceptable alignment. There is progression in healing seen [de-identified] : 5th metatarsal base fracture with improved healing and no change in alignment. [de-identified] : inversion 20 degrees [de-identified] : eversion 15 degrees [TWNoteComboBox7] : dorsiflexion 10 degrees

## 2023-06-12 NOTE — ASSESSMENT
[FreeTextEntry1] : Patient is doing much better.\par She no longer needs the cam boot.\par Supportive sneakers.\par No high impact activities.\par \par Repeat x-ray will be performed at the next office visit.\par

## 2023-07-14 ENCOUNTER — APPOINTMENT (OUTPATIENT)
Dept: ORTHOPEDIC SURGERY | Facility: CLINIC | Age: 38
End: 2023-07-14